# Patient Record
(demographics unavailable — no encounter records)

---

## 2025-01-15 NOTE — PHYSICAL EXAM
[FreeTextEntry1] :   General: Cooperative, NAD HEENT: NC/AT, no carotid bruits Lungs: CTAB Chest: RRR, no murmurs Extremities: nontender, no erythema Neurological Examination: NIHSS:*** MS: AOx3. Appropriately interactive, normal affect. Speech fluent w/o paraphasic errors CN: PERLL, EOMI, V1-3 sensation intact, face symmetric, hearing intact, palate elevates symmetrically, tongue midline, SCM equal bilaterally Motor: normal bulk and tone, no tremor, rigidity or bradykinesia.  5/5 all over Sens: Intact to light touch. Reflexes: 2/4 all over, downgoing toes b/l Coord:  No dysmetria, LETICIA intact Gait: Normal

## 2025-01-15 NOTE — ASSESSMENT
[FreeTextEntry1] : 37 y/o F with IUD who presents for evaluation of headaches.  Patient has a prior hx of headaches that are suggestive of episodic migraines.  Had a recent severe episode of status migrainosus with recurrent visual auras requiring ED visit. CT head was negative.   MRI from 2017 reviewed: mild nonspecific white matter changes c/w UBOs seen in Migraineurs  Given new onset of visual auras (never had previously) will confirm no intracranial pathology.  Will also start on abortive therapy but given the IUD and recurrent auras, will not prescribe triptan.  PLAN: Migraine without and without Aura, Episodic - MRI brain without contrast - MRA head - Ubrelvy 100mg PRN for severe migraine - Advised to take Ubrelvy at the first sign of symptoms - Headache diary - Avoidance of triggers  - Follow up in 2 months following MRI brain

## 2025-01-15 NOTE — HISTORY OF PRESENT ILLNESS
[FreeTextEntry1] :  [facility] NEUROLOGY AT [place]  CC: Headaches HPI:35 y/o F with IUD who presents for evaluation of headaches.   Has had hx of intermittent headaches most of her life.  Began in her 20s.  Seen in ED recently one month ago for four day hx of headaches. Began as eyes hurting, noticing scintillating scotoma on/off with severe headaches afterwards behind the eyes.  Seen at [facility] and told it was migraine.    Duration: 20s Onset: gradual Quality: aching Severity: 7/10 Location: unilateral, could locate Headache duration:  Radiation: no Frequency: Once every few months, current frequency once per month few years Time of day headache occurs:  Alleviating factors: Previously advil, Tylenol ES Aggravating factors: Had breakthrough bleeding last month around bad headaches  Headache wakes patient from sleep?:  Treatments attempted: Advil, no relief during last period Family history of headaches: no Coffee consumption: once daily Sleep: 6-7 hours/night Headache status at time of visit: yes migraine coming on--had recent spotting again Associated Symptoms: Photophobia Vision changes: no Jaw claudication: no Aura: no Effect on social functioning:  Confusion: no Fever: no Recent trauma/head injury:no

## 2025-01-15 NOTE — RESULTS/DATA
[TextEntry] : CT head from December 2024 reviewed: Neg MRI from 2017 reviewed: mild nonspecific white matter changes c/w UBOs seen in Migraineurs

## 2025-03-19 NOTE — ASSESSMENT
[FreeTextEntry1] : 37 y/o F with IUD who presents for evaluation of headaches.  Patient has a prior hx of headaches that are suggestive of episodic migraines.  Had a recent severe episode of status migrainosus with recurrent visual auras requiring ED visit. CT head was negative.   MRI from 2017 reviewed: mild nonspecific white matter changes c/w UBOs seen in Migraine[facility]  MRI/MRA brain were neg.    PLAN: Migraine without and without Aura, Episodic - Imitrex prn migraine - Headache diary - Avoidance of triggers  - Follow up in 2 months

## 2025-03-19 NOTE — HISTORY OF PRESENT ILLNESS
[FreeTextEntry1] :  [facility] NEUROLOGY AT [place]  CC: Headaches HPI:35 y/o F with IUD who presents for follow-up of headaches.   Has had hx of intermittent headaches most of her life.  Began in her 20s.  Seen in ED recently one month ago for four day hx of headaches. Began as eyes hurting, noticing scintillating scotoma on/off with severe headaches afterwards behind the eyes.  Seen at [facility] and told it was migraine.    Duration: 20s Onset: gradual Quality: aching Severity: 7/10 Location: unilateral, could locate Headache duration:  Radiation: no Frequency: Once every few months, current frequency once per month few years Time of day headache occurs:  Alleviating factors: Previously advil, Tylenol ES Aggravating factors: Had breakthrough bleeding last month around bad headaches  Headache wakes patient from sleep?:  Treatments attempted: Advil, no relief during last period Family history of headaches: no Coffee consumption: once daily Sleep: 6-7 hours/night Headache status at time of visit: yes migraine coming on--had recent spotting again Associated Symptoms: Photophobia Vision changes: no Jaw claudication: no Aura: no Effect on social functioning:  Confusion: no Fever: no Recent trauma/head injury:no  Today 3/19/25: Thought to have migraine with aura.  MRI/MRA ordered which were neg.  Started on Ubrelvy but was not covered.  Current frequency is few per month.